# Patient Record
Sex: FEMALE | Race: WHITE | NOT HISPANIC OR LATINO | Employment: OTHER | URBAN - METROPOLITAN AREA
[De-identification: names, ages, dates, MRNs, and addresses within clinical notes are randomized per-mention and may not be internally consistent; named-entity substitution may affect disease eponyms.]

---

## 2021-03-11 ENCOUNTER — HOSPITAL ENCOUNTER (EMERGENCY)
Facility: CLINIC | Age: 71
Discharge: ANOTHER HOSPITAL | End: 2021-03-11
Attending: NURSE PRACTITIONER | Admitting: NURSE PRACTITIONER
Payer: COMMERCIAL

## 2021-03-11 ENCOUNTER — APPOINTMENT (OUTPATIENT)
Dept: GENERAL RADIOLOGY | Facility: CLINIC | Age: 71
End: 2021-03-11
Attending: NURSE PRACTITIONER
Payer: COMMERCIAL

## 2021-03-11 ENCOUNTER — TRANSFERRED RECORDS (OUTPATIENT)
Dept: HEALTH INFORMATION MANAGEMENT | Facility: CLINIC | Age: 71
End: 2021-03-11

## 2021-03-11 ENCOUNTER — APPOINTMENT (OUTPATIENT)
Dept: ULTRASOUND IMAGING | Facility: CLINIC | Age: 71
End: 2021-03-11
Attending: NURSE PRACTITIONER
Payer: COMMERCIAL

## 2021-03-11 VITALS
HEART RATE: 121 BPM | RESPIRATION RATE: 12 BRPM | DIASTOLIC BLOOD PRESSURE: 109 MMHG | OXYGEN SATURATION: 98 % | WEIGHT: 185 LBS | TEMPERATURE: 98.2 F | BODY MASS INDEX: 29.87 KG/M2 | SYSTOLIC BLOOD PRESSURE: 128 MMHG

## 2021-03-11 DIAGNOSIS — I48.91 ATRIAL FIBRILLATION WITH RVR (H): ICD-10-CM

## 2021-03-11 DIAGNOSIS — R06.09 DYSPNEA ON EXERTION: ICD-10-CM

## 2021-03-11 LAB
ALBUMIN SERPL-MCNC: 3.9 G/DL (ref 3.4–5)
ALP SERPL-CCNC: 73 U/L (ref 40–150)
ALT SERPL W P-5'-P-CCNC: 43 U/L (ref 0–50)
ANION GAP SERPL CALCULATED.3IONS-SCNC: 4 MMOL/L (ref 3–14)
APTT PPP: 26 SEC (ref 22–37)
AST SERPL W P-5'-P-CCNC: 32 U/L (ref 0–45)
BASOPHILS # BLD AUTO: 0 10E9/L (ref 0–0.2)
BASOPHILS NFR BLD AUTO: 0.4 %
BILIRUB SERPL-MCNC: 0.6 MG/DL (ref 0.2–1.3)
BUN SERPL-MCNC: 12 MG/DL (ref 7–30)
CALCIUM SERPL-MCNC: 9.4 MG/DL (ref 8.5–10.1)
CHLORIDE SERPL-SCNC: 108 MMOL/L (ref 94–109)
CO2 SERPL-SCNC: 27 MMOL/L (ref 20–32)
CREAT SERPL-MCNC: 0.73 MG/DL (ref 0.52–1.04)
DIFFERENTIAL METHOD BLD: ABNORMAL
EOSINOPHIL # BLD AUTO: 0.1 10E9/L (ref 0–0.7)
EOSINOPHIL NFR BLD AUTO: 1.6 %
ERYTHROCYTE [DISTWIDTH] IN BLOOD BY AUTOMATED COUNT: 12.8 % (ref 10–15)
FLUAV RNA RESP QL NAA+PROBE: NEGATIVE
FLUBV RNA RESP QL NAA+PROBE: NEGATIVE
GFR SERPL CREATININE-BSD FRML MDRD: 83 ML/MIN/{1.73_M2}
GLUCOSE SERPL-MCNC: 120 MG/DL (ref 70–99)
HCT VFR BLD AUTO: 38.8 % (ref 35–47)
HGB BLD-MCNC: 12.6 G/DL (ref 11.7–15.7)
IMM GRANULOCYTES # BLD: 0 10E9/L (ref 0–0.4)
IMM GRANULOCYTES NFR BLD: 0.2 %
INR PPP: 1 (ref 0.86–1.14)
LABORATORY COMMENT REPORT: NORMAL
LYMPHOCYTES # BLD AUTO: 1.3 10E9/L (ref 0.8–5.3)
LYMPHOCYTES NFR BLD AUTO: 22.6 %
MCH RBC QN AUTO: 33.3 PG (ref 26.5–33)
MCHC RBC AUTO-ENTMCNC: 32.5 G/DL (ref 31.5–36.5)
MCV RBC AUTO: 103 FL (ref 78–100)
MONOCYTES # BLD AUTO: 0.6 10E9/L (ref 0–1.3)
MONOCYTES NFR BLD AUTO: 10.3 %
NEUTROPHILS # BLD AUTO: 3.7 10E9/L (ref 1.6–8.3)
NEUTROPHILS NFR BLD AUTO: 64.9 %
NRBC # BLD AUTO: 0 10*3/UL
NRBC BLD AUTO-RTO: 0 /100
NT-PROBNP SERPL-MCNC: 2856 PG/ML (ref 0–900)
PLATELET # BLD AUTO: 190 10E9/L (ref 150–450)
POTASSIUM SERPL-SCNC: 4.8 MMOL/L (ref 3.4–5.3)
PROT SERPL-MCNC: 7.4 G/DL (ref 6.8–8.8)
RBC # BLD AUTO: 3.78 10E12/L (ref 3.8–5.2)
RSV RNA SPEC QL NAA+PROBE: NORMAL
SARS-COV-2 RNA RESP QL NAA+PROBE: NEGATIVE
SODIUM SERPL-SCNC: 139 MMOL/L (ref 133–144)
SPECIMEN SOURCE: NORMAL
TROPONIN I SERPL-MCNC: 0.02 UG/L (ref 0–0.04)
WBC # BLD AUTO: 5.6 10E9/L (ref 4–11)

## 2021-03-11 PROCEDURE — 93010 ELECTROCARDIOGRAM REPORT: CPT | Performed by: FAMILY MEDICINE

## 2021-03-11 PROCEDURE — 85610 PROTHROMBIN TIME: CPT | Performed by: NURSE PRACTITIONER

## 2021-03-11 PROCEDURE — 84484 ASSAY OF TROPONIN QUANT: CPT | Performed by: NURSE PRACTITIONER

## 2021-03-11 PROCEDURE — 71046 X-RAY EXAM CHEST 2 VIEWS: CPT

## 2021-03-11 PROCEDURE — 93005 ELECTROCARDIOGRAM TRACING: CPT | Performed by: FAMILY MEDICINE

## 2021-03-11 PROCEDURE — 99285 EMERGENCY DEPT VISIT HI MDM: CPT | Mod: 25 | Performed by: FAMILY MEDICINE

## 2021-03-11 PROCEDURE — 93970 EXTREMITY STUDY: CPT

## 2021-03-11 PROCEDURE — 96374 THER/PROPH/DIAG INJ IV PUSH: CPT | Performed by: FAMILY MEDICINE

## 2021-03-11 PROCEDURE — 87636 SARSCOV2 & INF A&B AMP PRB: CPT | Performed by: NURSE PRACTITIONER

## 2021-03-11 PROCEDURE — C9803 HOPD COVID-19 SPEC COLLECT: HCPCS | Performed by: FAMILY MEDICINE

## 2021-03-11 PROCEDURE — 250N000009 HC RX 250: Performed by: NURSE PRACTITIONER

## 2021-03-11 PROCEDURE — 80053 COMPREHEN METABOLIC PANEL: CPT | Performed by: NURSE PRACTITIONER

## 2021-03-11 PROCEDURE — 99284 EMERGENCY DEPT VISIT MOD MDM: CPT | Mod: 25 | Performed by: FAMILY MEDICINE

## 2021-03-11 PROCEDURE — 85025 COMPLETE CBC W/AUTO DIFF WBC: CPT | Performed by: NURSE PRACTITIONER

## 2021-03-11 PROCEDURE — 85730 THROMBOPLASTIN TIME PARTIAL: CPT | Performed by: NURSE PRACTITIONER

## 2021-03-11 PROCEDURE — 250N000013 HC RX MED GY IP 250 OP 250 PS 637: Performed by: NURSE PRACTITIONER

## 2021-03-11 PROCEDURE — 83880 ASSAY OF NATRIURETIC PEPTIDE: CPT | Performed by: NURSE PRACTITIONER

## 2021-03-11 RX ORDER — CICLOPIROX 80 MG/ML
SOLUTION TOPICAL EVERY EVENING
COMMUNITY
Start: 2020-11-30

## 2021-03-11 RX ORDER — ASPIRIN 81 MG/1
81 TABLET ORAL DAILY
COMMUNITY

## 2021-03-11 RX ORDER — METOPROLOL SUCCINATE 25 MG/1
25 TABLET, EXTENDED RELEASE ORAL DAILY
COMMUNITY
Start: 2021-01-12

## 2021-03-11 RX ORDER — RIBOFLAVIN (VITAMIN B2) 100 MG
2 TABLET ORAL DAILY
COMMUNITY

## 2021-03-11 RX ORDER — ZINC GLUCONATE 50 MG
50 TABLET ORAL EVERY OTHER DAY
COMMUNITY

## 2021-03-11 RX ORDER — 7-OXODEHYDROEPIANDROSTERONE,MC 100 %
2 POWDER (GRAM) MISCELLANEOUS DAILY
COMMUNITY

## 2021-03-11 RX ORDER — METOPROLOL TARTRATE 1 MG/ML
5 INJECTION, SOLUTION INTRAVENOUS EVERY 5 MIN PRN
Status: DISCONTINUED | OUTPATIENT
Start: 2021-03-11 | End: 2021-03-11 | Stop reason: HOSPADM

## 2021-03-11 RX ORDER — METOPROLOL TARTRATE 25 MG/1
25 TABLET, FILM COATED ORAL ONCE
Status: COMPLETED | OUTPATIENT
Start: 2021-03-11 | End: 2021-03-11

## 2021-03-11 RX ORDER — LANOLIN ALCOHOL/MO/W.PET/CERES
400 CREAM (GRAM) TOPICAL DAILY
COMMUNITY

## 2021-03-11 RX ORDER — CYANOCOBALAMIN (VITAMIN B-12) 500 MCG
400 LOZENGE ORAL AT BEDTIME
COMMUNITY

## 2021-03-11 RX ORDER — ROPINIROLE 0.5 MG/1
0.5 TABLET, FILM COATED ORAL AT BEDTIME
COMMUNITY
Start: 2021-03-02

## 2021-03-11 RX ORDER — AZELASTINE 1 MG/ML
1 SPRAY, METERED NASAL EVERY MORNING
COMMUNITY

## 2021-03-11 RX ORDER — ROSUVASTATIN CALCIUM 10 MG/1
5 TABLET, COATED ORAL AT BEDTIME
COMMUNITY
Start: 2021-03-01

## 2021-03-11 RX ORDER — BACILLUS COAGULANS/INULIN 1B-250 MG
1 CAPSULE ORAL EVERY MORNING
COMMUNITY

## 2021-03-11 RX ADMIN — METOPROLOL TARTRATE 25 MG: 25 TABLET ORAL at 14:06

## 2021-03-11 RX ADMIN — METOPROLOL TARTRATE 5 MG: 1 INJECTION, SOLUTION INTRAVENOUS at 14:07

## 2021-03-11 ASSESSMENT — ENCOUNTER SYMPTOMS
COUGH: 0
FATIGUE: 0
FEVER: 0
SHORTNESS OF BREATH: 1
MUSCULOSKELETAL NEGATIVE: 1
WHEEZING: 1
NEUROLOGICAL NEGATIVE: 1
GASTROINTESTINAL NEGATIVE: 1
CHILLS: 0

## 2021-03-11 NOTE — ED PROVIDER NOTES
"  History     Chief Complaint   Patient presents with     Shortness of Breath     Leg Swelling     HPI     Thu Zamora is a 70 year old female with history of paroxysmal atrial fibrillation and restless leg syndrome who presents to the emergency department for evaluation of bilateral leg swelling and intermittent feeling of slight shortness of breath that she describes as being able to \"feel her breathing\" and wheezing. Noted some increased shortness of breath yesterday when going up some stairs. Denies fever or chills. Denies dizziness or lightheadedness. Traveled by car from [place] returning here to [place] 3 days ago. Takes Metoprolol 25 mg daily for paroxysmal a-fib. She has never needed cardioversion. Has been several years since she had known a-fib. Follows with Dr. Butterfield with [facility], [place], operated by [facility]. Had an event monitor over 2 years ago without evidence of A-fib. Takes Baby ASA daily      Cardiac Event Monitor 11/2018:    Baseline tracing documented sinus rhythm at 60 beats per minute.  No symptomatic transmissions were made during monitoring.  Auto triggered events available for review showed some sinus rhythm or sinus bradycardia with premature atrial contractions.  No dysrhythmias were seen during monitoring  Echo 2015  Interpretation Summary  The left ventricle is normal in size. The visual ejection fraction is   estimated at 60-65%. Grade I left ventricular diastolic dysfunction is noted.   No regional wall motion abnormalities noted. No significant valvular heart   Disease.    Allergies:  No Known Allergies    Problem List:    Patient Active Problem List    Diagnosis Date Noted     Atrial fibrillation with rapid rate, paroxysmal 03/21/2015     Priority: Medium     Family history stroke in brother 03/21/2015     Priority: Medium     Mild alcohol use disorder 03/21/2015     Priority: Medium     Paroxysmal atrial fibrillation (H) 03/21/2015     Priority: Medium        Past Medical History:  "   Past Medical History:   Diagnosis Date     Hypertension        Past Surgical History:    Past Surgical History:   Procedure Laterality Date     HC KNEE SCOPE,MED/LAT MENISCUS REPAIR Right        Family History:    Family History   Problem Relation Age of Onset     Heart Disease Mother         leaky valve, atrial fibrillation     Heart Disease Brother         leaky valve, atrial fibrillation     Cerebrovascular Disease Brother         2 strokes       Social History:  Marital Status:   [2]  Social History     Tobacco Use     Smoking status: Former Smoker     Quit date: 1995     Years since quittin.2     Smokeless tobacco: Never Used   Substance Use Topics     Alcohol use: Yes     Alcohol/week: 11.7 standard drinks     Types: 14 Cans of beer per week     Comment: 3 drinks/day     Drug use: No        Medications:    7-Keto DHEA POWD  aspirin 81 MG EC tablet  azelastine (ASTELIN) 0.1 % nasal spray  Bacillus Coagulans-Inulin (PROBIOTIC) 1-250 BILLION-MG CAPS  calcium carbonate (OS-SUMMER 500 MG [place]. [place]) 500 MG tablet  cetirizine (ZYRTEC) 10 MG tablet  ciclopirox (PENLAC) 8 % external solution  glucosamine-chondroitinoitin 500-400 MG tablet  magnesium oxide (MAG-OX) 400 (240 Mg) MG tablet  metoprolol succinate ER (TOPROL-XL) 25 MG 24 hr tablet  polyethylene glycol-propylene glycol (SYSTANE ULTRA) 0.4-0.3 % SOLN ophthalmic solution  rOPINIRole (REQUIP) 0.5 MG tablet  rosuvastatin (CRESTOR) 10 MG tablet  vitamin E 400 units TABS  White Petrolatum-Mineral Oil (TEARS AGAIN) OINT  zinc gluconate 50 MG tablet  fluticasone (FLONASE) 50 MCG/ACT nasal spray  predniSONE (DELTASONE) 20 MG tablet          Review of Systems   Constitutional: Negative for chills, fatigue and fever.   HENT: Negative.    Respiratory: Positive for shortness of breath and wheezing. Negative for cough.    Cardiovascular: Positive for leg swelling. Negative for chest pain.   Gastrointestinal: Negative.    Genitourinary: Negative.     Musculoskeletal: Negative.    Skin: Negative.    Neurological: Negative.    All other systems reviewed and are negative.      Physical Exam   BP: (!) 145/99  Pulse: 143  Temp: 98.2  F (36.8  C)  Resp: 16  Weight: 83.9 kg (185 lb)  SpO2: 99 %      Physical Exam  Vitals signs reviewed.   Constitutional:       General: She is not in acute distress.     Appearance: She is well-developed. She is not ill-appearing.   HENT:      Head: Normocephalic and atraumatic.      Nose: Nose normal.      Mouth/Throat:      Mouth: Mucous membranes are moist.   Eyes:      Conjunctiva/sclera: Conjunctivae normal.   Cardiovascular:      Rate and Rhythm: Tachycardia present. Rhythm irregular.      Heart sounds: No murmur.   Pulmonary:      Effort: Pulmonary effort is normal. No respiratory distress.      Breath sounds: Normal breath sounds. No wheezing or rhonchi.   Abdominal:      General: Bowel sounds are normal. There is no distension.      Palpations: Abdomen is soft.      Tenderness: There is no abdominal tenderness.   Musculoskeletal: Normal range of motion.      Right lower leg: Edema present.      Left lower leg: Edema present.   Skin:     General: Skin is warm and dry.   Neurological:      General: No focal deficit present.      Mental Status: She is alert and oriented to person, place, and time.         ED Course        Procedures               EKG Interpretation:      Interpreted by SUNDAY Nicholson CNP  Time reviewed: 1320  Symptoms at time of EKG: none, dyspnea with exertion   Rhythm: atrial fibrillation  Rate: tachycardia (120-140s)  Axis: normal  Ectopy: none  Conduction: normal  ST Segments/ T Waves: No ST-T wave changes, decreasing R-wave progression  Q Waves: none  Comparison to prior: changed compared to 07/08/2015    Clinical Impression: Atrial fibrillation with RVR, no evidence of acute ischemia.        Results for orders placed or performed during the hospital encounter of 03/11/21 (from the past 24  hour(s))   CBC with platelets differential   Result Value Ref Range    WBC 5.6 4.0 - 11.0 10e9/L    RBC Count 3.78 (L) 3.8 - 5.2 10e12/L    Hemoglobin 12.6 11.7 - 15.7 g/dL    Hematocrit 38.8 35.0 - 47.0 %     (H) 78 - 100 fl    MCH 33.3 (H) 26.5 - 33.0 pg    MCHC 32.5 31.5 - 36.5 g/dL    RDW 12.8 10.0 - 15.0 %    Platelet Count 190 150 - 450 10e9/L    Diff Method Automated Method     % Neutrophils 64.9 %    % Lymphocytes 22.6 %    % Monocytes 10.3 %    % Eosinophils 1.6 %    % Basophils 0.4 %    % Immature Granulocytes 0.2 %    Nucleated RBCs 0 0 /100    Absolute Neutrophil 3.7 1.6 - 8.3 10e9/L    Absolute Lymphocytes 1.3 0.8 - 5.3 10e9/L    Absolute Monocytes 0.6 0.0 - 1.3 10e9/L    Absolute Eosinophils 0.1 0.0 - 0.7 10e9/L    Absolute Basophils 0.0 0.0 - 0.2 10e9/L    Abs Immature Granulocytes 0.0 0 - 0.4 10e9/L    Absolute Nucleated RBC 0.0    Comprehensive metabolic panel   Result Value Ref Range    Sodium 139 133 - 144 mmol/L    Potassium 4.8 3.4 - 5.3 mmol/L    Chloride 108 94 - 109 mmol/L    Carbon Dioxide 27 20 - 32 mmol/L    Anion Gap 4 3 - 14 mmol/L    Glucose 120 (H) 70 - 99 mg/dL    Urea Nitrogen 12 7 - 30 mg/dL    Creatinine 0.73 0.52 - 1.04 mg/dL    GFR Estimate 83 >60 mL/min/[1.73_m2]    GFR Estimate If Black >90 >60 mL/min/[1.73_m2]    Calcium 9.4 8.5 - 10.1 mg/dL    Bilirubin Total 0.6 0.2 - 1.3 mg/dL    Albumin 3.9 3.4 - 5.0 g/dL    Protein Total 7.4 6.8 - 8.8 g/dL    Alkaline Phosphatase 73 40 - 150 U/L    ALT 43 0 - 50 U/L    AST 32 0 - 45 U/L   Troponin I   Result Value Ref Range    Troponin I ES 0.022 0.000 - 0.045 ug/L   INR   Result Value Ref Range    INR 1.00 0.86 - 1.14   Partial thromboplastin time   Result Value Ref Range    PTT 26 22 - 37 sec   NT pro BNP   Result Value Ref Range    N-Terminal Pro BNP Inpatient 2,856 (H) 0 - 900 pg/mL   Symptomatic Influenza A/B & SARS-CoV2 (COVID-19) Virus PCR Multiplex    Specimen: Nasopharyngeal   Result Value Ref Range    Flu A/B & SARS-COV-2  PCR Source Nasopharyngeal     SARS-CoV-2 PCR Result NEGATIVE     Influenza A PCR Negative NEG^Negative    Influenza B PCR Negative NEG^Negative    Respiratory Syncytial Virus PCR (Note)     Flu A/B & SARS-CoV-2 PCR Comment (Note)    US Lower Extremity Venous Duplex Bilateral    Narrative    VENOUS ULTRASOUND BILATERALLY LEG(S)  3/11/2021 3:23 PM     HISTORY: bilateral LE swelling, left > right.    COMPARISON: None.    FINDINGS: Examination of the deep veins with graded compression and  color flow Doppler with spectral wave form analysis was performed.  Images show no evidence of thrombus in the bilateral common femoral  vein, femoral vein, popliteal vein or calf veins.      Impression    IMPRESSION: No deep vein thrombosis in either lower extremity.      CRISTOPHER DIAZ, DO   XR Chest 2 Views    Narrative    XR CHEST TWO VIEWS   3/11/2021 4:50 PM     HISTORY: Short of breath    COMPARISON: Chest x-rays dated 7/5/2015.    FINDINGS:  The lungs are clear. No pleural effusions or pneumothorax.  Heart size and pulmonary vascularity are within normal limits. No  acute fracture.      Impression    IMPRESSION: No evidence of acute cardiopulmonary disease is seen.    TIERA KIMBLE MD       Medications   metoprolol (LOPRESSOR) injection 5 mg (5 mg Intravenous Given 3/11/21 1407)   metoprolol tartrate (LOPRESSOR) tablet 25 mg (25 mg Oral Given 3/11/21 1406)     4:08 PM: spoke with Dr. Maier, on-call with [facility], recommends admission, anticoagulation, and likely a ELOISE with cardioversion   4:35 PM: spoke with Dr. Lawson (sp?) at [facility]. Will assume care on transfer.  4:43 PM: bed control ([facility]oey) at [facility] states no tele beds, patient refuses [facility]. They will continue to work on the bed.  5:00 PM: [facility] notified me that [facility] has a bed and patient will transfer there with same pro    Assessments & Plan (with Medical Decision Making)   70-year-old female with history of paroxysmal  atrial fibrillation who presents to the emergency department for evaluation of dyspnea on exertion that started approximately 1-2 weeks ago, patient is vague about when this started.  Patient has EKG confirming atrial fibrillation with RVR (a rate of 140s.)  Patient otherwise has no symptoms of chest pain, headache, dizziness or lightheadedness.  Patient recently drove back from a vacation in [place] and returned to [place] 3 days ago by car.  She reports increased bilateral lower extremity edema after the care ride.  Ultrasound was obtained to rule out DVT and is negative for DVT.  Her lung sounds are CTA.  No hypoxia. BP mildly elevated here. Patient takes metoprolol 25 mg a day for paroxysmal A. fib, she has no history of hypertension.  CBC is unremarkable.  Glucose is 120, patient has no history of diabetes.  Electrolytes are normal.  Kidney function labs are normal.  LFTs are normal.  BNP is elevated at 2856.  Patient has no history for heart failure, but has not had any recent echocardiogram.  Last echo was in 2015 with an EF of 60 to 65%. Chest xray is normal without evidence of failure. Troponin is 0.022.  Covid test was obtained and is negative.  ChadVasc score is 1. Patient is currently on baby ASA daily. No prior history of cardioversion. Patient was given IV Metoprolol 5 mg and PO metoprolol 25 mg with minimal improvement in her rate (down to 100-120b/min)    Patient currently follows with [facility] (cardiology), Dr. Butterfield.  I spoke with the on-call cardiologist for this group, Dr. Maier, who recommends admission to the hospital, anticoagulation, and likely ELOISE with cardioversion. I spoke with hospitalist at [facility], Dr. Lawson (sp?) who agrees to assume care of patient on transfer. hospitalist expressed concern of starting patient on anticoagulation without knowing if she is having heart failure, and anticipates getting an echocardiogram there. I will defer the  anticoagulation medication to the hospital care team. The HUC here was notified that there was not a tele bed at [facility], but could accept the patient at [facility]. Patient agrees with going to [facility], but states she does not want to go to [facility]. Patient transferred via EMS, stable.     I have reviewed the nursing notes.    I have reviewed the findings, diagnosis, plan and need for follow up with the patient.       IXVBO-2-Pflg Score  (calculator)  Background  Calculates overall risk of atrial fibrillation related CVA based on 7 factors: Age>=65-75, CHF, Htn, CVA/TIA, DM, vascular disease, female  Data  70 year old year old female  has Atrial fibrillation with rapid rate, paroxysmal; Family history stroke in brother; Mild alcohol use disorder; and Paroxysmal atrial fibrillation (H) on their problem list.  Criteria   Of possible 6 points for 5 possible items  1 point: Female    Interpretation  AOOSM-3-Dlac Score: 1  CHADS Score 1 (CVA risk >4% per year):  Warfarin or Aspirin      HAS-BLED Score for Major Bleeding on anticoagulants (calculator)  Background  Calculates probability of major bleeding on anticoagulants based on 9 criteria including hypertension, creatinine,  LFTs, stroke history, major bleeding history, labile INRs, age over 65, other anticoagulant use and excessive alcohol.  Data  70 year old  has Atrial fibrillation with rapid rate, paroxysmal; Family history stroke in brother; Mild alcohol use disorder; and Paroxysmal atrial fibrillation (H) on their problem list.  @cmedp@   reports current alcohol use of about 11.7 standard drinks of alcohol per week.  BP: 120/88  Lab Results   Component Value Date    CR 0.69 07/08/2015     Lab Results   Component Value Date    BILITOTAL 0.4 07/08/2015    ALKPHOS 68 07/08/2015    AST 16 07/08/2015    ALT 22 07/08/2015      Lab Results   Component Value Date    INR 1.01 03/22/2015    INR 0.95 03/21/2015     Criteria   Score one point for each present (up  to 9 points):  Other anticoagulants, antiplatelet agents or NSAIDs  Interpretation  HAS-BLED Score: 1  Score 1 points:  Major bleeding risk 3.4% per year        Discharge Medication List as of 3/11/2021  6:31 PM          Final diagnoses:   Atrial fibrillation with RVR (H)   Dyspnea on exertion       3/11/2021   [facility] EMERGENCY DEPT     Chinyere Dean APRN CNP  03/11/21 1941

## 2021-03-11 NOTE — ED TRIAGE NOTES
Pt presents with shortness of breath at the beginning of the month when they were still in [place]. Pt states she could hear her breathing. Pt states they drove back from [place], driving 6 hours a day in the car and drove Friday and got home Monday. Pt states she noticed swelling in her left leg and now right leg starting to swell too.

## 2021-03-19 ENCOUNTER — IMMUNIZATION (OUTPATIENT)
Dept: FAMILY MEDICINE | Facility: CLINIC | Age: 71
End: 2021-03-19
Payer: COMMERCIAL

## 2021-03-19 PROCEDURE — 91301 PR COVID VAC MODERNA 100 MCG/0.5 ML IM: CPT

## 2021-03-19 PROCEDURE — 0011A PR COVID VAC MODERNA 100 MCG/0.5 ML IM: CPT

## 2021-04-12 ENCOUNTER — TELEPHONE (OUTPATIENT)
Dept: FAMILY MEDICINE | Facility: CLINIC | Age: 71
End: 2021-04-12

## 2021-04-12 NOTE — TELEPHONE ENCOUNTER
Patient called and states she has had her 1st COVID 19 vaccination last week, she was exposed to someone that was positive for COVID and concerned if she can get her 2nd vaccination.    This RN advised to complete the vaccination process and keep her upcoming appointment for the 2nd vaccination.    Patient stated understanding.    Cassie Diamond RN

## 2021-04-16 ENCOUNTER — IMMUNIZATION (OUTPATIENT)
Dept: FAMILY MEDICINE | Facility: CLINIC | Age: 71
End: 2021-04-16
Attending: FAMILY MEDICINE
Payer: COMMERCIAL

## 2021-04-16 PROCEDURE — 91301 PR COVID VAC MODERNA 100 MCG/0.5 ML IM: CPT

## 2021-04-16 PROCEDURE — 0012A PR COVID VAC MODERNA 100 MCG/0.5 ML IM: CPT

## 2021-07-29 ENCOUNTER — OFFICE VISIT (OUTPATIENT)
Dept: PODIATRY | Facility: CLINIC | Age: 71
End: 2021-07-29
Payer: COMMERCIAL

## 2021-07-29 VITALS
WEIGHT: 186.5 LBS | HEIGHT: 66 IN | DIASTOLIC BLOOD PRESSURE: 68 MMHG | BODY MASS INDEX: 29.97 KG/M2 | TEMPERATURE: 96.3 F | SYSTOLIC BLOOD PRESSURE: 124 MMHG

## 2021-07-29 DIAGNOSIS — L60.8 PINCER NAIL DEFORMITY: Primary | ICD-10-CM

## 2021-07-29 DIAGNOSIS — L60.3 ONYCHODYSTROPHY: ICD-10-CM

## 2021-07-29 PROCEDURE — 99203 OFFICE O/P NEW LOW 30 MIN: CPT | Performed by: PODIATRIST

## 2021-07-29 RX ORDER — LATANOPROST 50 UG/ML
SOLUTION/ DROPS OPHTHALMIC
COMMUNITY
Start: 2021-07-12

## 2021-07-29 RX ORDER — LOSARTAN POTASSIUM 25 MG/1
25 TABLET ORAL
COMMUNITY
Start: 2021-05-12

## 2021-07-29 RX ORDER — SOTALOL HYDROCHLORIDE 80 MG/1
TABLET ORAL
COMMUNITY
Start: 2021-07-02

## 2021-07-29 ASSESSMENT — PAIN SCALES - GENERAL: PAINLEVEL: MODERATE PAIN (5)

## 2021-07-29 ASSESSMENT — MIFFLIN-ST. JEOR: SCORE: 1377.71

## 2021-07-29 NOTE — PATIENT INSTRUCTIONS
"Nail Debridement    A high quality instrument makes trimming toenails MUCH easier.  Search ebay for any 5\" nail nipper manufactured by reliable brands such as Miltex, Integra or Jarit as these quality instruments will help manage difficult nails more effectively and comfortably. We use Miltex -SS.  A physician is not necessary to trim nails even if you are taking blood thinners or are diabetic.  Your family or care givers may help manage your toenails.      Trim or sand the nails once weekly.  Do not wait until they are long and painful or trimming will become too difficult and painful and will increase your risk of complications or infection.  A course file or 120 grit sandpaper on a sanding block can be helpful.  For very thick nails many people prefer battery operated zhu such as an Amope', Personal Pedi and Emjoi for regular use or heavy painful callouses or thick toenails.    Trim or skive any portion of nail that is thick, loose, crumbling, or not well attached. Do not tear the nail away, but rather cut them with a nail nipperor sand or sand them down.  You may follow up with your Podiatric Physician if you have pain, bleeding, infection, questions or other concerns.      You may also contact the following Registered Nurses for further help with nail debridement and minor hygiene concnerns.  They may come to your home or meet them at their clinic to trim your toenails and soak your feet, as well as monitor for any complications that would require evaluation by a Physician.      [facility]  Mariama Sandhu RN  Phone & text [phone]    [facility]  Maddison Ruggiero RN  Office [phone]    [facility]  Marianne Mora RN  [phone]   Call or text for appointment  Some home visits and has a clinic at:  [address] [place] [id number]    [facility]  Guerrero Viveros RN  [phone]    [facility]  [phone]  [place], [place], [place]    [facility]  [phone]  [email or web address]    For up to date list and to find foot care nurses in other communities visit American [employer] Nurses Association website:  afcna.org.     Calluses, Corns, IPKs, Porokeratosis    When there is excessive friction or pressure on the skin, the body responds by making the skin thicker.  While this may protect the deeper structures, the thickened skin can take up more space and thus increase pressure over a bony prominence or become an open sore or skin ulcer as this skin becomes less flexible.    Flat, diffuse thickening are simple calluses and they are usually caused by friction.  Often these are the result of rubbing on a shoe or going barefoot.    Calluses with a central core between the toes are called corns.  These often result from prominent joints on adjacent toes rubbing together.  Theses are often a symptom of bone malalignment and will usually recur unless the underlying bones are addressed.    Many of these lesions can be kept comfortable with routine maintenance. This consists of filing them with a Ped Egg, callus file, or 120 grit sandpaper on a block, every day during your bath or shower.  Most people prefer battery operated zhu such as an Amope', Personal Pedi and Emjoi for regular use or heavy painful callouses.  Heavy creams or ointments can be applied 1-2 times every day to keep them soft. Toe spacers can be used for corns, gel pads can be used for other lesions on the bottom of the foot. If there is a deformity noted, such as a prominent bone, often this can be addressed to minimize recurrence. However, sometimes the pressure and lesion simply migrates to another spot after surgery, so it is not a guaranteed cure.     If you have severe callouses and cracking, you may apply heavy ointments that you scoop up such as Cetaphil cream, Eucerin, Aquaphor or  Vaseline.  Be sure to obtain cream or ointment in these brands and not lotion (lotion is water based and not durable enough for feet). For more aggressive help apply heavy creams or ointment under occlusive dressings such as Saran Wrap or Jelly Feet while sleeping.   Jelly Feet can be obtained at www.jellyfeet.com.     To be successful with managing hyperkeratotic skin, you must manage hygiene daily.  Apply the cream once or twice EVERY day.  At your bath or shower time is the easiest time to work on this when skin is most soft.  There is no medical or surgical treatment that will absolutely eliminate many of these symptoms.      Pedifix is a reliable source for all sorts of foot pads, cushions, or interdigital spacers and foot appliances. Go to www.Povio.Violet Grey or request a catalog at 5-962-[employer].        Please call with any additional questions.

## 2021-07-29 NOTE — PROGRESS NOTES
"HPI:  Thu Zamora is a 70 year old female who is seen in consultation at the request of self.      Pt presents for eval of:      Bilateral great toenail fungus, Right toenail has some puss coming out of the left corner.     Works as a volunteer work .    Weight management plan: Patient was referred to their PCP to discuss a diet and exercise plan.       Review of Systems:  Patient denies fever, chills, rash, wound, stiffness, limping, numbness, weakness, heart burn, blood in stool, chest pain with activity, calf pain when walking, shortness of breath with activity, chronic cough, easy bleeding/bruising, swelling of ankles, excessive thirst, fatigue, depression, anxiety.  Patient admits only to symptoms noted in history.     Patient Active Problem List   Diagnosis     Atrial fibrillation with rapid rate, paroxysmal     Family history stroke in brother     Mild alcohol use disorder     Paroxysmal atrial fibrillation (H)     PAST MEDICAL HISTORY:   Past Medical History:   Diagnosis Date     Hypertension      PAST SURGICAL HISTORY:   Past Surgical History:   Procedure Laterality Date     HC KNEE SCOPE,MED/LAT MENISCUS REPAIR Right      MEDICATIONS:   Current Outpatient Medications:      7-Keto DHEA POWD, Take 2 capsules by mouth daily Diet pill \"KETO\", Disp: , Rfl:      azelastine (ASTELIN) 0.1 % nasal spray, Spray 1 spray into both nostrils every morning, Disp: , Rfl:      calcium carbonate (OS-SUMMER 500 MG [place]. [place]) 500 MG tablet, Take 500 mg by mouth daily, Disp: , Rfl:      cetirizine (ZYRTEC) 10 MG tablet, 1 tab up to twice a day for itch/rash, Disp: 20 tablet, Rfl: 1     ciclopirox (PENLAC) 8 % external solution, Apply topically every evening, Disp: , Rfl:      fluticasone (FLONASE) 50 MCG/ACT nasal spray, Spray 2 sprays into both nostrils daily, Disp: , Rfl:      glucosamine-chondroitinoitin 500-400 MG tablet, Take 2 tablets by mouth daily, Disp: , Rfl:      latanoprost (XALATAN) 0.005 % ophthalmic solution, , " Disp: , Rfl:      losartan (COZAAR) 25 MG tablet, Take 25 mg by mouth, Disp: , Rfl:      magnesium oxide (MAG-OX) 400 (240 Mg) MG tablet, Take 400 mg by mouth daily, Disp: , Rfl:      metoprolol succinate ER (TOPROL-XL) 25 MG 24 hr tablet, Take 25 mg by mouth daily, Disp: , Rfl:      polyethylene glycol-propylene glycol (SYSTANE ULTRA) 0.4-0.3 % SOLN ophthalmic solution, Place 1 drop into both eyes every morning, Disp: , Rfl:      rOPINIRole (REQUIP) 0.5 MG tablet, Take 0.5 mg by mouth At Bedtime, Disp: , Rfl:      rosuvastatin (CRESTOR) 10 MG tablet, Take 5 mg by mouth At Bedtime, Disp: , Rfl:      vitamin E 400 units TABS, Take 400 Units by mouth At Bedtime, Disp: , Rfl:      White Petrolatum-Mineral Oil (TEARS AGAIN) OINT, Place 1 strip into both ears At Bedtime, Disp: , Rfl:      zinc gluconate 50 MG tablet, Take 50 mg by mouth every other day, Disp: , Rfl:      aspirin 81 MG EC tablet, Take 81 mg by mouth daily (Patient not taking: Reported on 2021), Disp: , Rfl:      Bacillus Coagulans-Inulin (PROBIOTIC) 1-250 BILLION-MG CAPS, Take 1 capsule by mouth every morning (Patient not taking: Reported on 2021), Disp: , Rfl:      predniSONE (DELTASONE) 20 MG tablet, 1 tab daily for 3 days then 1/2 tab daily for 2 days (Patient not taking: Reported on 2021), Disp: 4 tablet, Rfl: 0     sotalol (BETAPACE) 80 MG tablet, , Disp: , Rfl:   ALLERGIES:  No Known Allergies  SOCIAL HISTORY:   Social History     Socioeconomic History     Marital status:      Spouse name: Not on file     Number of children: Not on file     Years of education: Not on file     Highest education level: Not on file   Occupational History     Not on file   Tobacco Use     Smoking status: Former Smoker     Quit date: 1995     Years since quittin.5     Smokeless tobacco: Never Used   Substance and Sexual Activity     Alcohol use: Yes     Alcohol/week: 11.7 standard drinks     Types: 14 Cans of beer per week     Comment: 3  "drinks/day     Drug use: No     Sexual activity: Never   Other Topics Concern     Parent/sibling w/ CABG, MI or angioplasty before 65F 55M? Not Asked   Social History Narrative     Not on file     Social Determinants of Health     Financial Resource Strain:      Difficulty of Paying Living Expenses:    Food Insecurity:      Worried About Running Out of Food in the Last Year:      Ran Out of Food in the Last Year:    Transportation Needs:      Lack of Transportation (Medical):      Lack of Transportation (Non-Medical):    Physical Activity:      Days of Exercise per Week:      Minutes of Exercise per Session:    Stress:      Feeling of Stress :    Social Connections:      Frequency of Communication with Friends and Family:      Frequency of Social Gatherings with Friends and Family:      Attends Jew Services:      Active Member of Clubs or Organizations:      Attends Club or Organization Meetings:      Marital Status:    Intimate Partner Violence:      Fear of Current or Ex-Partner:      Emotionally Abused:      Physically Abused:      Sexually Abused:      FAMILY HISTORY:   Family History   Problem Relation Age of Onset     Heart Disease Mother         leaky valve, atrial fibrillation     Heart Disease Brother         leaky valve, atrial fibrillation     Cerebrovascular Disease Brother         2 strokes     EXAM:Vitals: /68 (BP Location: Left arm, Patient Position: Sitting, Cuff Size: Adult Regular)   Temp (!) 96.3  F (35.7  C) (Temporal)   Ht 1.676 m (5' 6\")   Wt 84.6 kg (186 lb 8 oz)   BMI 30.10 kg/m    BMI= Body mass index is 30.1 kg/m .    General appearance: Patient is alert and fully cooperative with history & exam.  No sign of distress is noted during the visit.     Psychiatric: Affect is pleasant & appropriate.  Patient appears motivated to improve health.     Respiratory: Breathing is regular & unlabored while sitting.     HEENT: Hearing is intact to spoken word.  Speech is clear.  No gross " evidence of visual impairment that would impact ambulation.     Vascular: DP 2/4 & PT 2/4 left & right.  CFT delayed with dependent rubor noted about the digits.  Diminished hair growth distal to mid tibia and no hair about the foot and toes.  Temperature changes noted, warm to cool proximal to distal.  Hemosiderin pigmentation noted with multiple varicosities legs and feet bilateral. Generalized edema bilateral legs and feet.  Pt denies claudication history.     Neurologic: Normal plantar response bilateral.  Intact protective threshold plus 10/10 applications of a 5.07 monofilament.  Pt admits no burning and paraesthesias about the feet and toes with palpation.     Dermatologic: Reasonable texture turgor tone about the integument.  Both hallux nails are forming pincer nails.  They are painful along the borders without drainage.     Musculoskeletal: Patient is ambulatory without assistive device or brace.  There is semi reducible contracture of the lesser digits.    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 Onychodystrophy  L60.3         PLAN:    7/29/2021  Discussed etiology and treatment options.  Primary pathology here is not associated with onychomycosis.  Primary pathology is dystrophic nail and pincer ingrown pincer nails.  Topical and/or oral antifungals may reduce any fungal component but will not change the shape or thickness or adhesion of her toenail.  Simple moisturizers skin moisturizer such as Cetaphil cream may soften her nail.  I would not expect any antifungal medication to have much of an effect on her toenails.  We discussed appropriate debridement techniques and written instructions were dispensed.  We discussed personal Armstrong as well as foot care certified nurses if necessary.    We also discussed surgical removal of the affected toenails.  We discussed the postoperative  course potential risks and complications.  At this time she would like to consider this and will follow-up for matrixectomy if her symptoms warrant more advanced treatment  All questions were answered      Kris Jean DPM

## 2021-07-29 NOTE — LETTER
"    7/29/2021         RE: Thu Zamora  [address] [facility] [id number]        Dear Colleague,    Thank you for referring your patient, Thu Zamora, to the [facility]. Please see a copy of my visit note below.    HPI:  Thu Zamora is a 70 year old female who is seen in consultation at the request of self.      Pt presents for eval of:      Bilateral great toenail fungus, Right toenail has some puss coming out of the left corner.     Works as a volunteer work .    Weight management plan: Patient was referred to their PCP to discuss a diet and exercise plan.       Review of Systems:  Patient denies fever, chills, rash, wound, stiffness, limping, numbness, weakness, heart burn, blood in stool, chest pain with activity, calf pain when walking, shortness of breath with activity, chronic cough, easy bleeding/bruising, swelling of ankles, excessive thirst, fatigue, depression, anxiety.  Patient admits only to symptoms noted in history.     Patient Active Problem List   Diagnosis     Atrial fibrillation with rapid rate, paroxysmal     Family history stroke in brother     Mild alcohol use disorder     Paroxysmal atrial fibrillation (H)     PAST MEDICAL HISTORY:   Past Medical History:   Diagnosis Date     Hypertension      PAST SURGICAL HISTORY:   Past Surgical History:   Procedure Laterality Date     HC KNEE SCOPE,MED/LAT MENISCUS REPAIR Right      MEDICATIONS:   Current Outpatient Medications:      7-Keto DHEA POWD, Take 2 capsules by mouth daily Diet pill \"KETO\", Disp: , Rfl:      azelastine (ASTELIN) 0.1 % nasal spray, Spray 1 spray into both nostrils every morning, Disp: , Rfl:      calcium carbonate (OS-SUMMER 500 MG [place]. [place]) 500 MG tablet, Take 500 mg by mouth daily, Disp: , Rfl:      cetirizine (ZYRTEC) 10 MG tablet, 1 tab up to twice a day for itch/rash, Disp: 20 tablet, Rfl: 1     ciclopirox (PENLAC) 8 % external solution, Apply topically every evening, Disp: , " Rfl:      fluticasone (FLONASE) 50 MCG/ACT nasal spray, Spray 2 sprays into both nostrils daily, Disp: , Rfl:      glucosamine-chondroitinoitin 500-400 MG tablet, Take 2 tablets by mouth daily, Disp: , Rfl:      latanoprost (XALATAN) 0.005 % ophthalmic solution, , Disp: , Rfl:      losartan (COZAAR) 25 MG tablet, Take 25 mg by mouth, Disp: , Rfl:      magnesium oxide (MAG-OX) 400 (240 Mg) MG tablet, Take 400 mg by mouth daily, Disp: , Rfl:      metoprolol succinate ER (TOPROL-XL) 25 MG 24 hr tablet, Take 25 mg by mouth daily, Disp: , Rfl:      polyethylene glycol-propylene glycol (SYSTANE ULTRA) 0.4-0.3 % SOLN ophthalmic solution, Place 1 drop into both eyes every morning, Disp: , Rfl:      rOPINIRole (REQUIP) 0.5 MG tablet, Take 0.5 mg by mouth At Bedtime, Disp: , Rfl:      rosuvastatin (CRESTOR) 10 MG tablet, Take 5 mg by mouth At Bedtime, Disp: , Rfl:      vitamin E 400 units TABS, Take 400 Units by mouth At Bedtime, Disp: , Rfl:      White Petrolatum-Mineral Oil (TEARS AGAIN) OINT, Place 1 strip into both ears At Bedtime, Disp: , Rfl:      zinc gluconate 50 MG tablet, Take 50 mg by mouth every other day, Disp: , Rfl:      aspirin 81 MG EC tablet, Take 81 mg by mouth daily (Patient not taking: Reported on 7/29/2021), Disp: , Rfl:      Bacillus Coagulans-Inulin (PROBIOTIC) 1-250 BILLION-MG CAPS, Take 1 capsule by mouth every morning (Patient not taking: Reported on 7/29/2021), Disp: , Rfl:      predniSONE (DELTASONE) 20 MG tablet, 1 tab daily for 3 days then 1/2 tab daily for 2 days (Patient not taking: Reported on 7/29/2021), Disp: 4 tablet, Rfl: 0     sotalol (BETAPACE) 80 MG tablet, , Disp: , Rfl:   ALLERGIES:  No Known Allergies  SOCIAL HISTORY:   Social History     Socioeconomic History     Marital status:      Spouse name: Not on file     Number of children: Not on file     Years of education: Not on file     Highest education level: Not on file   Occupational History     Not on file   Tobacco Use      "Smoking status: Former Smoker     Quit date: 1995     Years since quittin.5     Smokeless tobacco: Never Used   Substance and Sexual Activity     Alcohol use: Yes     Alcohol/week: 11.7 standard drinks     Types: 14 Cans of beer per week     Comment: 3 drinks/day     Drug use: No     Sexual activity: Never   Other Topics Concern     Parent/sibling w/ CABG, MI or angioplasty before 65F 55M? Not Asked   Social History Narrative     Not on file     Social Determinants of Health     Financial Resource Strain:      Difficulty of Paying Living Expenses:    Food Insecurity:      Worried About Running Out of Food in the Last Year:      Ran Out of Food in the Last Year:    Transportation Needs:      Lack of Transportation (Medical):      Lack of Transportation (Non-Medical):    Physical Activity:      Days of Exercise per Week:      Minutes of Exercise per Session:    Stress:      Feeling of Stress :    Social Connections:      Frequency of Communication with Friends and Family:      Frequency of Social Gatherings with Friends and Family:      Attends Adventism Services:      Active Member of Clubs or Organizations:      Attends Club or Organization Meetings:      Marital Status:    Intimate Partner Violence:      Fear of Current or Ex-Partner:      Emotionally Abused:      Physically Abused:      Sexually Abused:      FAMILY HISTORY:   Family History   Problem Relation Age of Onset     Heart Disease Mother         leaky valve, atrial fibrillation     Heart Disease Brother         leaky valve, atrial fibrillation     Cerebrovascular Disease Brother         2 strokes     EXAM:Vitals: /68 (BP Location: Left arm, Patient Position: Sitting, Cuff Size: Adult Regular)   Temp (!) 96.3  F (35.7  C) (Temporal)   Ht 1.676 m (5' 6\")   Wt 84.6 kg (186 lb 8 oz)   BMI 30.10 kg/m    BMI= Body mass index is 30.1 kg/m .    General appearance: Patient is alert and fully cooperative with history & exam.  No sign of distress " is noted during the visit.     Psychiatric: Affect is pleasant & appropriate.  Patient appears motivated to improve health.     Respiratory: Breathing is regular & unlabored while sitting.     HEENT: Hearing is intact to spoken word.  Speech is clear.  No gross evidence of visual impairment that would impact ambulation.     Vascular: DP 2/4 & PT 2/4 left & right.  CFT delayed with dependent rubor noted about the digits.  Diminished hair growth distal to mid tibia and no hair about the foot and toes.  Temperature changes noted, warm to cool proximal to distal.  Hemosiderin pigmentation noted with multiple varicosities legs and feet bilateral. Generalized edema bilateral legs and feet.  Pt denies claudication history.     Neurologic: Normal plantar response bilateral.  Intact protective threshold plus 10/10 applications of a 5.07 monofilament.  Pt admits no burning and paraesthesias about the feet and toes with palpation.     Dermatologic: Reasonable texture turgor tone about the integument.  Both hallux nails are forming pincer nails.  They are painful along the borders without drainage.     Musculoskeletal: Patient is ambulatory without assistive device or brace.  There is semi reducible contracture of the lesser digits.    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 Onychodystrophy  L60.3         PLAN:    7/29/2021  Discussed etiology and treatment options.  Primary pathology here is not associated with onychomycosis.  Primary pathology is dystrophic nail and pincer ingrown pincer nails.  Topical and/or oral antifungals may reduce any fungal component but will not change the shape or thickness or adhesion of her toenail.  Simple moisturizers skin moisturizer such as Cetaphil cream may soften her nail.  I would not expect any antifungal medication to have much of an effect on her toenails.  We  discussed appropriate debridement techniques and written instructions were dispensed.  We discussed personal Armstrong as well as foot care certified nurses if necessary.    We also discussed surgical removal of the affected toenails.  We discussed the postoperative course potential risks and complications.  At this time she would like to consider this and will follow-up for matrixectomy if her symptoms warrant more advanced treatment  All questions were answered      Kris Jean DPM        Again, thank you for allowing me to participate in the care of your patient.        Sincerely,        Kris Jean DPM

## 2025-03-11 ENCOUNTER — OFFICE VISIT (OUTPATIENT)
Dept: SLEEP MEDICINE | Facility: CLINIC | Age: 75
End: 2025-03-11
Payer: COMMERCIAL

## 2025-03-11 VITALS
OXYGEN SATURATION: 97 % | HEIGHT: 66 IN | WEIGHT: 190 LBS | HEART RATE: 64 BPM | BODY MASS INDEX: 30.53 KG/M2 | RESPIRATION RATE: 12 BRPM | SYSTOLIC BLOOD PRESSURE: 129 MMHG | DIASTOLIC BLOOD PRESSURE: 81 MMHG

## 2025-03-11 DIAGNOSIS — R35.1 NOCTURIA: ICD-10-CM

## 2025-03-11 DIAGNOSIS — E66.811 OBESITY (BMI 30.0-34.9): ICD-10-CM

## 2025-03-11 DIAGNOSIS — E83.19 OTHER DISORDERS OF IRON METABOLISM: ICD-10-CM

## 2025-03-11 DIAGNOSIS — G25.81 RESTLESS LEGS SYNDROME (RLS): ICD-10-CM

## 2025-03-11 DIAGNOSIS — R06.81 WITNESSED APNEIC SPELLS: Primary | ICD-10-CM

## 2025-03-11 PROCEDURE — 3074F SYST BP LT 130 MM HG: CPT

## 2025-03-11 PROCEDURE — 3079F DIAST BP 80-89 MM HG: CPT

## 2025-03-11 PROCEDURE — 82728 ASSAY OF FERRITIN: CPT

## 2025-03-11 PROCEDURE — 36415 COLL VENOUS BLD VENIPUNCTURE: CPT

## 2025-03-11 PROCEDURE — 99203 OFFICE O/P NEW LOW 30 MIN: CPT

## 2025-03-11 PROCEDURE — 1126F AMNT PAIN NOTED NONE PRSNT: CPT

## 2025-03-11 RX ORDER — MIRABEGRON 25 MG/1
25 TABLET, FILM COATED, EXTENDED RELEASE ORAL DAILY
COMMUNITY
Start: 2025-02-12

## 2025-03-11 RX ORDER — APIXABAN 5 MG/1
1 TABLET, FILM COATED ORAL
COMMUNITY
Start: 2025-02-07

## 2025-03-11 ASSESSMENT — SLEEP AND FATIGUE QUESTIONNAIRES
HOW LIKELY ARE YOU TO NOD OFF OR FALL ASLEEP WHILE SITTING INACTIVE IN A PUBLIC PLACE: WOULD NEVER DOZE
HOW LIKELY ARE YOU TO NOD OFF OR FALL ASLEEP IN A CAR, WHILE STOPPED FOR A FEW MINUTES IN TRAFFIC: WOULD NEVER DOZE
HOW LIKELY ARE YOU TO NOD OFF OR FALL ASLEEP WHILE SITTING AND TALKING TO SOMEONE: WOULD NEVER DOZE
HOW LIKELY ARE YOU TO NOD OFF OR FALL ASLEEP WHEN YOU ARE A PASSENGER IN A CAR FOR AN HOUR WITHOUT A BREAK: WOULD NEVER DOZE
HOW LIKELY ARE YOU TO NOD OFF OR FALL ASLEEP WHILE SITTING AND READING: SLIGHT CHANCE OF DOZING
HOW LIKELY ARE YOU TO NOD OFF OR FALL ASLEEP WHILE WATCHING TV: WOULD NEVER DOZE
HOW LIKELY ARE YOU TO NOD OFF OR FALL ASLEEP WHILE SITTING QUIETLY AFTER LUNCH WITHOUT ALCOHOL: WOULD NEVER DOZE
HOW LIKELY ARE YOU TO NOD OFF OR FALL ASLEEP WHILE LYING DOWN TO REST IN THE AFTERNOON WHEN CIRCUMSTANCES PERMIT: SLIGHT CHANCE OF DOZING

## 2025-03-11 NOTE — PROGRESS NOTES
Outpatient Sleep Medicine Consultation:      Name: Thu Zamora MRN# [id number]   Age: 74 year old YOB: 1950     Date of Consultation: March 11, 2025  Consultation is requested by: No referring provider defined for this encounter. No ref. provider found  Primary care provider: Berna Madsen       Reason for Sleep Consult:     Thu Zamora is sent by No ref. provider found for a sleep consultation regarding suspected sleep apnea.    Patient s Reason for visit  Thu Zamora main reason for visit: (Patient-Rptd) A colonoscapy doctor said i needed one  Patient states problem(s) started: (Patient-Rptd) last fall  Thu Zamora's goals for this visit: (Patient-Rptd) find out if i stop stop breathing a lot while sleeping           Assessment and Plan:     Summary Sleep Diagnoses:  (R06.81) Witnessed apneic spells  (primary encounter diagnosis) (E66.811) Obesity (BMI 30.0-34.9)   (R35.1) Nocturia   Comment: Thu is a 74-year-old female who is sent to the sleep clinic after having witnessed apnea episodes during her colonoscopy. She has been told she snores and she was told she stopped breathing a few times during her colonoscopy in October. Every once in a while she will have a gasp arousal. She usually feels rested in the morning. She is not normally tired during the day. Thu does wake 2-3 times per night to use the bathroom. Her STOP-BANG is 4/8- snoring, observed apnea, HTN, and age > 50 (74).        Plan: HST-Home Sleep Apnea Test - Noxturnal Returnable  Thu is at intermediate risk for obstructive sleep apnea. She would like to complete a home sleep study. She doesn't like to drive at night. She is a good candidate for a home sleep study. We reviewed treatment options for obstructive sleep apnea including PAP therapy, mandibular advancement device, positional therapy, surgery, weight management, and hypoglossal nerve stimulator. Thu would like to first work on weight loss. She would like  to lose 30 lbs.     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 She has been on ropinirole for years. She also has a pedal bike and CBD oil that will help relieve restless legs. No ferritin on file. She notices restless legs are worse when she is more sedentary or on her feet too much during the day.    Plan: Ferritin  Continue taking 0.5 mg ropinirole at bedtime. Recommended checking a ferritin level and starting iron supplementation if her ferritin were low.     Comorbid Diagnoses:  Obesity, paroxymal atrial fibrillation s/p ablation, HTN, RLS    Summary Recommendations:  Orders Placed This Encounter   Procedures    HST-Home Sleep Apnea Test - Noxturnal Returnable    Ferritin     Summary Counseling:    Sleep Testing Reviewed  Obstructive Sleep Apnea Reviewed  Complications of Untreated Sleep Apnea Reviewed    Patient will follow up approximately 3 months after sleep study. Results of the study will be reviewed via [facility] and treatment will be initiated prior to the follow up visit.   SUNDAY Olivier CNP    Total time spent reviewing medical records, history and physical examination, review of previous testing and interpretation as well as documentation on this date: 34 minutes     CC: No ref. provider found          History of Present Illness:     Thu is sent to the sleep clinic after having witnessed apnea events during her colonoscopy. She has been told she snores and she was told she stopped breathing a few times during her colonoscopy in October. Every once in a while she will have a gasp arousal. She usually feels rested in the morning. She is not normally tired during the day.        She would like to lose 30 lbs.    Her  did report snoring before he passed away two years ago. He had lung cancer.     RLS managed with 0.5 mg ropinirole at bedtime. She has been on ropinirole for years. She  also has a pedal bike that will help relieve restless legs. No ferritin on file. She has also tried CBD oil.       Past Sleep Evaluations: None     SLEEP-WAKE SCHEDULE:     Work/School Days: Patient goes to school/work: (Patient-Rptd) Yes   Usually gets into bed at (Patient-Rptd) 10ish 10:30-11 PM on non work days   Takes patient about (Patient-Rptd) several minutes to fall asleep  Has trouble falling asleep (Patient-Rptd) almost every night nights per week  Wakes up in the middle of the night (Patient-Rptd) 2~3 times a night.  Wakes up due to (Patient-Rptd) Snorting self awake;Use the bathroom;Other  She has trouble falling back asleep   times a week.   It usually takes   to get back to sleep  Patient is usually up at 6 am on work days and 8-9 AM on non work days  Uses alarm: (Patient-Rptd) Yes    Weekends/Non-work Days/All Other Days:  Usually gets into bed at (Patient-Rptd) 10:30   Takes patient about (Patient-Rptd) 10~15 minutes to fall asleep  Patient is usually up at (Patient-Rptd) 8 or 9 am  Uses alarm: (Patient-Rptd) No    Sleep Need  Patient gets (Patient-Rptd) 7~8 hours sleep on average   Patient thinks she needs about (Patient-Rptd) 8~9 hours sleep    Thu JIMENES Kyleejan prefers to sleep in this position(s): (Patient-Rptd) Side   Patient states they do the following activities in bed: (Patient-Rptd) Read    Naps  Patient takes a purposeful nap (Patient-Rptd) 1 or 2 times a week and naps are usually (Patient-Rptd) 1/2 to 1 hour in duration. Maybe once per week. She does not normally nap.   She feels better after a nap: (Patient-Rptd) Yes  She dozes off unintentionally (Patient-Rptd) 0 days per week  Patient has had a driving accident or near-miss due to sleepiness/drowsiness: (Patient-Rptd) No    SLEEP DISRUPTIONS:    Breathing/Snoring  Patient snores:   Other people complain about her snoring:    Patient has been told she stops breathing in her sleep:(Patient-Rptd) Yes  She has issues with the following:  Denies morning headaches unless she is having sinus issues.     Movement:  Patient gets pain, discomfort, with an urge to move: (Patient-Rptd) Yes She does have restless legs.   It happens when she is resting: (Patient-Rptd) No  It happens more at night:     Patient has been told she kicks her legs at night:        Behaviors in Sleep:  Thu Zamora has experienced the following behaviors while sleeping:    She has experienced sudden muscle weakness during the day: (Patient-Rptd) No  Pt denies bruxism, sleep talking, sleep walking, and dream enactment behavior. Pt denies sleep paralysis, hypnagogue and cataplexy.    Is there anything else you would like your sleep provider to know: (Patient-Rptd) i have restless leg    CAFFEINE AND OTHER SUBSTANCES:    Patient consumes caffeinated beverages per day: (Patient-Rptd) 3~4cups  Last caffeine use is usually: (Patient-Rptd) am  List of any prescribed or over the counter stimulants that patient takes:    List of any prescribed or over the counter sleep medication patient takes: None   List of previous sleep medications that patient has tried: None   Patient drinks alcohol to help them sleep: (Patient-Rptd) No  Patient drinks alcohol near bedtime: (Patient-Rptd) Yes    Family History:  Patient has a family member been diagnosed with a sleep disorder: (Patient-Rptd) No      Social History: Her  passed away 3 years ago. She works three days per week. She goes to [place] in the winter.    SCALES:    EPWORTH SLEEPINESS SCALE         3/11/2025    11:23 AM    [place] Sleepiness Scale ( [employer]  [id number]<br>ESS - [place]/English - Final version - 21 Nov 07 - [facility] [place].)   Sitting and reading Slight chance of dozing   Watching TV Would never doze   Sitting, inactive in a public place (e.g. a theatre or a meeting) Would never doze   As a passenger in a car for an hour without a break Would never doze   Lying down to rest in the afternoon when circumstances  "permit Slight chance of dozing   Sitting and talking to someone Would never doze   Sitting quietly after a lunch without alcohol Would never doze   In a car, while stopped for a few minutes in traffic Would never doze   [place] Score (MC) 2   [place] Score (Sleep) 2        Patient-reported         INSOMNIA SEVERITY INDEX (PANFILO)          3/11/2025    11:04 AM   Insomnia Severity Index (PANFILO)   Difficulty falling asleep 2   Difficulty staying asleep 2   Problems waking up too early 2   How SATISFIED/DISSATISFIED are you with your CURRENT sleep pattern? 2   How NOTICEABLE to others do you think your sleep problem is in terms of impairing the quality of your life? 2   How WORRIED/DISTRESSED are you about your current sleep problem? 2   To what extent do you consider your sleep problem to INTERFERE with your daily functioning (e.g. daytime fatigue, mood, ability to function at work/daily chores, concentration, memory, mood, etc.) CURRENTLY? 2   PANFILO Total Score 14        Patient-reported       Guidelines for Scoring/Interpretation:  Total score categories:  0-7 = No clinically significant insomnia   8-14 = Subthreshold insomnia   15-21 = Clinical insomnia (moderate severity)  22-28 = Clinical insomnia (severe)  Used via courtesy of www.[employer]ix[employer].va.gov with permission from Chad Zimmerman PhD., [facility]      STOP BANG         3/11/2025    11:38 AM   STOP BANG Questionnaire (  2008, the American Society of Anesthesiologists, Inc. [employer].)   [facility]: 129/81         GAD7         No data to display                  CAGE-AID         No data to display                CAGE-AID reprinted with permission from the [place] Medical Journal, DIEGO Martino and MACKENZIE Izaguirre, \"Conjoint screening questionnaires for alcohol and drug abuse\" [place] Medical Journal 94: 135-140, 1995.      PATIENT HEALTH QUESTIONNAIRE-9 (PHQ - 9)         No data to display                Developed by Kayla SUGGS " Zoey Gregory, Gurdeep Pillai and colleagues, with an educational sharon from [employer] No permission required to reproduce, translate, display or distribute.        Allergies:    No Known Allergies    Medications:    Current Outpatient Medications   Medication Sig Dispense Refill    Ascorbic Acid (VITAMIN C PO) Take by mouth.      azelastine (ASTELIN) 0.1 % nasal spray Spray 1 spray into both nostrils every morning      Bacillus Coagulans-Inulin (PROBIOTIC) 1-250 BILLION-MG CAPS Take 1 capsule by mouth every morning.      calcium carbonate (OS-SUMMER 500 MG [place]. [place]) 500 MG tablet Take 500 mg by mouth daily      cetirizine (ZYRTEC) 10 MG tablet 1 tab up to twice a day for itch/rash 20 tablet 1    ciclopirox (PENLAC) 8 % external solution Apply topically every evening      ELIQUIS ANTICOAGULANT 5 MG tablet Take 1 tablet by mouth 2 times daily.      glucosamine-chondroitinoitin 500-400 MG tablet Take 2 tablets by mouth daily      latanoprost (XALATAN) 0.005 % ophthalmic solution       losartan (COZAAR) 25 MG tablet Take 25 mg by mouth      magnesium oxide (MAG-OX) 400 (240 Mg) MG tablet Take 400 mg by mouth daily      metoprolol succinate ER (TOPROL-XL) 25 MG 24 hr tablet Take 25 mg by mouth daily      mirabegron (MYRBETRIQ) 25 MG 24 hr tablet Take 25 mg by mouth daily.      polyethylene glycol-propylene glycol (SYSTANE ULTRA) 0.4-0.3 % SOLN ophthalmic solution Place 1 drop into both eyes every morning      rOPINIRole (REQUIP) 0.5 MG tablet Take 0.5 mg by mouth At Bedtime      rosuvastatin (CRESTOR) 10 MG tablet Take 5 mg by mouth At Bedtime      sotalol (BETAPACE) 80 MG tablet       vitamin E 400 units TABS Take 400 Units by mouth At Bedtime      zinc gluconate 50 MG tablet Take 50 mg by mouth every other day         Problem List:  Patient Active Problem List    Diagnosis Date Noted    Atrial fibrillation with rapid rate, paroxysmal 03/21/2015     Priority: Medium    Family history stroke in brother  2015     Priority: Medium    Mild alcohol use disorder 2015     Priority: Medium    Paroxysmal atrial fibrillation (H) 2015     Priority: Medium        Past Medical/Surgical History:  Past Medical History:   Diagnosis Date    Hypertension      Past Surgical History:   Procedure Laterality Date    HC KNEE SCOPE,MED/LAT MENISCUS REPAIR Right        Social History:  Social History     Socioeconomic History    Marital status:      Spouse name: Not on file    Number of children: Not on file    Years of education: Not on file    Highest education level: Not on file   Occupational History    Not on file   Tobacco Use    Smoking status: Former     Current packs/day: 0.00     Types: Cigarettes     Quit date: 1995     Years since quittin.2    Smokeless tobacco: Never   Substance and Sexual Activity    Alcohol use: Yes     Alcohol/week: 11.7 standard drinks of alcohol     Types: 14 Cans of beer per week     Comment: 3 drinks/day    Drug use: No    Sexual activity: Never   Other Topics Concern    Parent/sibling w/ CABG, MI or angioplasty before 65F 55M? Not Asked   Social History Narrative    Not on file     Social Drivers of Health     Financial Resource Strain: Not on file   Food Insecurity: Not on file   Transportation Needs: Not on file   Physical Activity: Not on file   Stress: Not on file   Social Connections: Not on file   Interpersonal Safety: Not At Risk (2023)    Received from [facility]    [facility] Custom IPV     Do you feel UNSAFE in any of your personal relationships with your family members or any other acquaintances?: No   Housing Stability: Not on file       Family History:  Family History   Problem Relation Age of Onset    Heart Disease Mother         leaky valve, atrial fibrillation    Heart Disease Brother         leaky valve, atrial fibrillation    Cerebrovascular Disease Brother         2 strokes       Review of Systems:  A complete  "review of systems reviewed by me is negative with the exeption of what has been mentioned in the history of present illness.  In the last TWO WEEKS have you experienced any of the following symptoms?  Fevers: (Patient-Rptd) No  Night Sweats: (Patient-Rptd) Yes  Weight Gain: (Patient-Rptd) Yes  Pain at Night: (Patient-Rptd) No  Double Vision: (Patient-Rptd) No  Changes in Vision: (Patient-Rptd) No  Difficulty Breathing through Nose: (Patient-Rptd) Yes  Sore Throat in Morning: (Patient-Rptd) No  Dry Mouth in the Morning: (Patient-Rptd) Yes  Shortness of Breath Lying Flat: (Patient-Rptd) No  Shortness of Breath With Activity: (Patient-Rptd) Yes  Awakening with Shortness of Breath: (Patient-Rptd) No  Increased Cough: (Patient-Rptd) No  Heart Racing at Night: (Patient-Rptd) Yes  Swelling in Feet or Legs: (Patient-Rptd) No  Heartburn at Night: (Patient-Rptd) Yes  Urinating More than Once at Night: (Patient-Rptd) Yes  Losing Control of Urine at Night: (Patient-Rptd) No  Joint Pains at Night: (Patient-Rptd) No  Headaches in Morning: (Patient-Rptd) No  Weakness in Arms or Legs: (Patient-Rptd) Yes  Depressed Mood: (Patient-Rptd) No  Anxiety: (Patient-Rptd) No     Physical Examination:  Vitals: /81   Pulse 64   Resp 12   Ht 1.676 m (5' 6\")   Wt 86.2 kg (190 lb)   SpO2 97%   BMI 30.67 kg/m    BMI= Body mass index is 30.67 kg/m .    Neck Cir (cm): 40 cm    GENERAL APPEARANCE: healthy, alert, no distress, and cooperative  EYES: Eyes grossly normal to inspection, PERRL, and conjunctivae and sclerae normal  NECK: no asymmetry, masses, or scars  RESP: no increased work of breathing noted, no audible cough or wheeze   NEURO: mentation intact and speech normal  PSYCH: mentation appears normal and affect normal/bright         Data: All pertinent previous laboratory data reviewed     Recent Labs   Lab Test 03/11/21  1330      POTASSIUM 4.8   CHLORIDE 108   CO2 27   ANIONGAP 4   *   BUN 12   CR 0.73   SUMMER 9.4 " "      Recent Labs   Lab Test 03/11/21  1330   WBC 5.6   RBC 3.78*   HGB 12.6   HCT 38.8   *   MCH 33.3*   MCHC 32.5   RDW 12.8          Recent Labs   Lab Test 03/11/21  1330   PROTTOTAL 7.4   ALBUMIN 3.9   BILITOTAL 0.6   ALKPHOS 73   AST 32   ALT 43       TSH (mU/L)   Date Value   03/21/2015 2.01       No results found for: \"UAMP\", \"UBARB\", \"BENZODIAZEUR\", \"UCANN\", \"UCOC\", \"OPIT\", \"UPCP\"    No results found for: \"IRONSAT\", \"DU03788\", \"TOBY\"    No results found for: \"PH\", \"PHARTERIAL\", \"PO2\", \"BO9PRHUCUSP\", \"SAT\", \"PCO2\", \"HCO3\", \"BASEEXCESS\", \"MIAH\", \"BEB\"    @LABRCNTIPR(phv:4,pco2v:4,po2v:4,hco3v:4,yumiko:4,o2per:4)@    Echocardiology: No results found for this or any previous visit (from the past 4320 hours).    Chest x-ray:   No results found for this or any previous visit from the past 365 days.      Chest CT:   No results found for this or any previous visit from the past 365 days.      PFT: Most Recent [facility] Pulmonary Function Testing    Petrona Mars, SUNDAY CNP 3/11/2025   "

## 2025-03-11 NOTE — NURSING NOTE
"Chief Complaint   Patient presents with    Sleep Apnea     Hx of colonoscopy and during procedure patient would stop breathing.     Snoring    Sleep Problem     Patient states she has restless leg at night.        Initial /81   Pulse 64   Resp 12   Ht 1.676 m (5' 6\")   Wt 86.2 kg (190 lb)   SpO2 97%   BMI 30.67 kg/m   Estimated body mass index is 30.67 kg/m  as calculated from the following:    Height as of this encounter: 1.676 m (5' 6\").    Weight as of this encounter: 86.2 kg (190 lb).    Medication Reconciliation: complete    Neck circumference: 15.75 inches / 40 centimeters.    DME: N/A    Mateo Ramos CMA on 3/11/2025 at 11:37 AM      "

## 2025-03-11 NOTE — PATIENT INSTRUCTIONS
"          MY TREATMENT INFORMATION FOR SLEEP APNEA-  Thu Zamora    DOCTOR : SUNDAY Olivier CNP    Am I having a sleep study at a sleep center?  --->Due to normal delays, you will be contacted within 2-4 weeks to schedule    Am I having a home sleep study?  --->Watch the video for the device you are using:    -/drop off device-  https://www.Aledade.com/watch?v=yGGFBdELGhk    Frequently asked questions:  1. What is Obstructive Sleep Apnea (GARRY)? GARRY is the most common type of sleep apnea. Apnea means, \"without breath.\"  Apnea is most often caused by narrowing or collapse of the upper airway as muscles relax during sleep.   Almost everyone has occasional apneas. Most people with sleep apnea have had brief interruptions at night frequently for many years.  The severity of sleep apnea is related to how frequent and severe the events are.   2. What are the consequences of GARRY? Symptoms include: feeling sleepy during the day, snoring loudly, gasping or stopping of breathing, trouble sleeping, and occasionally morning headaches or heartburn at night. Sleepiness can be serious and even increase the risk of falling asleep while driving. Other health consequences may include development of high blood pressure and other cardiovascular disease in persons who are susceptible. Untreated GARRY can contribute to heart disease, stroke and diabetes.   3. What are the treatment options? In most situations, sleep apnea is a lifelong disease that must be managed with daily therapy. Medications are not effective for sleep apnea and surgery is generally not considered until other therapies have been tried. Your treatment is your choice . Continuous Positive Airway (CPAP) works right away and is the therapy that is effective in nearly everyone. An oral device to hold your jaw forward is usually the next most reliable option. Other options include postioning devices (to keep you off your back), weight loss, and surgery " including a tongue pacing device. There is more detail about some of these options below.  4. Are my sleep studies covered by insurance? Although we will request verification of coverage, we advise you also check in advance of the study to ensure there is coverage.    Important tips for those choosing CPAP and similar devices  REMEMBER-IF YOU RECEIVE A CALL FROM [phone]--> IT IS TO SETUP A DEVICE  For new devices, sign up for device LAKE to monitor your device for your followup visits  We encourage you to utilize the [employer] lake or website (https://[employer]/) to monitor your therapy progress and share the data with your healthcare team when you discuss your sleep apnea.                                                    Know your equipment:  CPAP is continuous positive airway pressure that prevents obstructive sleep apnea by keeping the throat from collapsing while you are sleeping. In most cases, the device is  smart  and can slowly self-adjusts if your throat collapses and keeps a record every day of how well you are treated-this information is available to you and your care team.  BPAP is bilevel positive airway pressure that keeps your throat open and also assists each breath with a pressure boost to maintain adequate breathing.  Special kinds of BPAP are used in patients who have inadequate breathing from lung or heart disease. In most cases, the device is  smart  and can slowly self-adjusts to assist breathing. Like CPAP, the device keeps a record of how well you are treated.  Your mask is your connection to the device. You get to choose what feels most comfortable and the staff will help to make sure if fits. Here: are some examples of the different masks that are available: Magnetic mask aids may assist with use but there are safety issues that should be addressed when considering with magnets* (see end of discussion).       Key points to remember on your journey with sleep apnea:  Sleep  study.  PAP devices often need to be adjusted during a sleep study to show that they are effective and adjusted right.  Good tips to remember: Try wearing just the mask during a quiet time during the day so your body adapts to wearing it. A humidifier is recommended for comfort in most cases to prevent drying of your nose and throat. Allergy medication from your provider may help you if you are having nasal congestion.  Getting settled-in. It takes more than one night for most of us to get used to wearing a mask. Try wearing just the mask during a quiet time during the day so your body adapts to wearing it. A humidifier is recommended for comfort in most cases. Our team will work with you carefully on the first day and will be in contact within 4 days and again at 2 and 4 weeks for advice and remote device adjustments. Your therapy is evaluated by the device each day.   Use it every night. The more you are able to sleep naturally for 7-8 hours, the more likely you will have good sleep and to prevent health risks or symptoms from sleep apnea. Even if you use it 4 hours it helps. Occasionally all of us are unable to use a medical therapy, in sleep apnea, it is not dangerous to miss one night.   Communicate. Call our skilled team on the number provided on the first day if your visit for problems that make it difficult to wear the device. Over 2 out of 3 patients can learn to wear the device long-term with help from our team. Remember to call our team or your sleep providers if you are unable to wear the device as we may have other solutions for those who cannot adapt to mask CPAP therapy. It is recommended that you sleep your sleep provider within the first 3 months and yearly after that if you are not having problems.   Use it for your health. We encourage use of CPAP masks during daytime quiet periods to allow your face and brain to adapt to the sensation of CPAP so that it will be a more natural sensation to awaken  to at night or during naps. This can be very useful during the first few weeks or months of adapting to CPAP though it does not help medically to wear CPAP during wakefulness and  should not be used as a strategy just to meet guidelines.  Take care of your equipment. Make sure you clean your mask and tubing using directions every day and that your filter and mask are replaced as recommended or if they are not working.     *Masks with magnets:  Updated Contraindications  Masks with magnetic components are contraindicated for use by patients where they, or anyone in close physical contact while using the mask, have the following:   Active medical implants that interact with magnets (i.e., pacemakers, implantable cardioverter defibrillators (ICD), neurostimulators, cerebrospinal fluid (CSF) shunts, insulin/infusion pumps)   Metallic implants/objects containing ferromagnetic material (i.e., aneurysm clips/flow disruption devices, embolic coils, stents, valves, electrodes, implants to restore hearing or balance with implanted magnets, ocular implants, metallic splinters in the eye)  Updated Warning  Keep the mask magnets at a safe distance of at least 6 inches (150 mm) away from implants or medical devices that may be adversely affected by magnetic interference. This warning applies to you or anyone in close physical contact with your mask. The magnets are in the frame and lower headgear clips, with a magnetic field strength of up to 400mT. When worn, they connect to secure the mask but may inadvertently detach while asleep.  Implants/medical devices, including those listed within contraindications, may be adversely affected if they change function under external magnetic fields or contain ferromagnetic materials that attract/repel to magnetic fields (some metallic implants, e.g., contact lenses with metal, dental implants, metallic cranial plates, screws, lex hole covers, and bone substitute devices). Consult your  physician and  of your implant / other medical device for information on the potential adverse effects of magnetic fields.    BESIDES CPAP, WHAT OTHER THERAPIES ARE THERE?    Positioning Device  Positioning devices are generally used when sleep apnea is mild and only occurs on your back.This example shows a pillow that straps around the waist. It may be appropriate for those whose sleep study shows milder sleep apnea that occurs primarily when lying flat on one's back. Preliminary studies have shown benefit but effectiveness at home may need to be verified by a home sleep test. These devices are generally not covered by medical insurance.  Examples of devices that maintain sleeping on the back to prevent snoring and mild sleep apnea.    Belt type body positioner  http://[employer]x/    Electronic reminder  http://nightshifttherapy.com/            Oral Appliance  What is oral appliance therapy?  An oral appliance device fits on your teeth at night like a retainer used after having braces. The device is made by a specialized dentist and requires several visits over 1-2 months before a manufactured device is made to fit your teeth and is adjusted to prevent your sleep apnea. Once an oral device is working properly, snoring should be improved. A home sleep test may be recommended at that time if to determine whether the sleep apnea is adequately treated.       Some things to remember:  -Oral devices are often, but not always, covered by your medical insurance. Be sure to check with your insurance provider.   -If you are referred for oral therapy, you will be given a list of specialized dentists to consider or you may choose to visit the Web site of the American Academy of Dental Sleep Medicine  -Oral devices are less likely to work if you have severe sleep apnea or are extremely overweight.     More detailed information  An oral appliance is a small acrylic device that fits over the upper and lower teeth   (similar to a retainer or a mouth guard). This device slightly moves jaw forward, which moves the base of the tongue forward, opens the airway, improves breathing for effective treat snoring and obstructive sleep apnea in perhaps 7 out of 10 people .  The best working devices are custom-made by a dental device  after a mold is made of the teeth 1, 2, 3.  When is an oral appliance indicated?  Oral appliance therapy is recommended as a first-line treatment for patients with primary snoring, mild sleep apnea, and for patients with moderate sleep apnea who prefer appliance therapy to use of CPAP4, 5. Severity of sleep apnea is determined by sleep testing and is based on the number of respiratory events per hour of sleep.   How successful is oral appliance therapy?  The success rate of oral appliance therapy in patients with mild sleep apnea is 75-80% while in patients with moderate sleep apnea it is 50-70%. The chance of success in patients with severe sleep apnea is 40-50%. The research also shows that oral appliances have a beneficial effect on the cardiovascular health of GARRY patients at the same magnitude as CPAP therapy7.  Oral appliances should be a second-line treatment in cases of severe sleep apnea, but if not completely successful then a combination therapy utilizing CPAP plus oral appliance therapy may be effective. Oral appliances tend to be effective in a broad range of patients although studies show that the patients who have the highest success are females, younger patients, those with milder disease, and less severe obesity. 3, 6.   Finding a dentist that practices dental sleep medicine  Specific training is available through the American Academy of Dental Sleep Medicine for dentists interested in working in the field of sleep. To find a dentist who is educated in the field of sleep and the use of oral appliances, near you, visit the Web site of the American Academy of Dental Sleep  Medicine.    References  1. Ana M et al. Objectively measured vs self-reported compliance during oral appliance therapy for sleep-disordered breathing. Chest 2013; 144(5): 8316-3401.  2. Aman et al. Objective measurement of compliance during oral appliance therapy for sleep-disordered breathing. Thorax 2013; 68(1): 91-96.  3. Kim et al. Mandibular advancement devices in 620 men and women with GARRY and snoring: tolerability and predictors of treatment success. Chest 2004; 125: 0967-4110.  4. Nettie et al. Oral appliances for snoring and GARRY: a review. Sleep 2006; 29: 244-262.  5. Soila et al. Oral appliance treatment for GARRY: an update. J Clin Sleep Med 2014; 10(2): 215-227.  6. Ariella et al. Predictors of OSAH treatment outcome. J Dent Res 2007; 86: 8048-8334.      Weight Loss: Your Body mass index is 30.67 kg/m .    Being overweight does not necessarily mean you will have health consequences.  Those who have BMI over 30 or over 27 with existing medical conditions carries greater risk.   Weight loss decreases severity of sleep apnea in most people with obesity. For those with mild obesity who have developed snoring with weight gain, even 15-30 pound weight loss can improve and occasionally milder eliminate sleep apnea.  Structured and life-long dietary and health habits are necessary to lose weight and keep healthier weight levels.     The Comprehensive Weight loss program offers all aspects of weight loss strategies including two Non-Surgical Weight Loss Programs: Medical Weight Management and our 24 Week Healthy Lifestyle Program:  Medical Weight Management: You will meet with a Medical Weight Management Provider, as well as a Registered Dietician. The program may include medication therapy, dietary education, recommended exercise and physical therapy programs, monthly support group meetings, and possible psychological counseling. Follow up visits with the provider or dietician are  scheduled based on your progress and needs.  24 Week Healthy Lifestyle Program: This unique program is designed to give you the support of weekly appointments and activities thru a 24-week period. It may include all of the components of the basic program (above), with the addition of 11 individual Health  Visits, 24-week access to the [employer] website for over 700 online classes, and monthly support group meetings. This program has an out-of-pocket expense of $499 to cover the items that can not be billed to insurance (health coaches and [employer] access), and is non-refundable/non-transferable (you may be able to use a Health Savings Account; ask your HSA provider). There may be an optional meal replacement plan prescribed as well.   Medication therapy has been approved for the treatment of sleep apnea: The FDA approved tirzepatide for moderate to severe sleep apnea (apnea-hypopnea index greater than or equal to 15) in patients with BMI of greater than or equal to 30, or BMI greater than or equal to 27 with at least 1 weight-related condition such as hypertension or dyslipidemia.  Surgical management achieves meaningful long-term weight loss and improvement in health risks in most patients with more severe obesity.      Sleep Apnea Surgery:    Surgery for obstructive sleep apnea is considered generally only when other therapies fail to work. Surgery may be discussed with you if you are having a difficult time tolerating CPAP and or when there is an abnormal structure that requires surgical correction.  Nose and throat surgeries often enlarge the airway to prevent collapse.  Most of these surgeries create pain for 1-2 weeks and up to half of the most common surgeries are not effective throughout life.  You should carefully discuss the benefits and drawbacks to surgery with your sleep provider and surgeon to determine if it is the best solution for you.   More information  Surgery for GARRY is directed at areas  that are responsible for narrowing or complete obstruction of the airway during sleep.  There are a wide range of procedures available to enlarge and/or stabilize the airway to prevent blockage of breathing in the three major areas where it can occur: the palate, tongue, and nasal regions.  Successful surgical treatment depends on the accurate identification of the factors responsible for obstructive sleep apnea in each person.  A personalized approach is required because there is no single treatment that works well for everyone.  Because of anatomic variation, consultation with an examination by a sleep surgeon is a critical first step in determining what surgical options are best for each patient.  In some cases, examination during sedation may be recommended in order to guide the selection of procedures.  Patients will be counseled about risks and benefits as well as the typical recovery course after surgery. Surgery is typically not a cure for a person s GARRY.  However, surgery will often significantly improve one s GARRY severity (termed  success rate ).  Even in the absence of a cure, surgery will decrease the cardiovascular risk associated with OSA7; improve overall quality of life8 (sleepiness, functionality, sleep quality, etc).    Palate Procedures:  Patients with GARRY often have narrowing of their airway in the region of their tonsils and uvula.  The goals of palate procedures are to widen the airway in this region as well as to help the tissues resist collapse.  Modern palate procedure techniques focus on tissue conservation and soft tissue rearrangement, rather than tissue removal.  Often the uvula is preserved in this procedure. Residual sleep apnea is common in patient after pharyngoplasty with an average reduction in sleep apnea events of 33%2.      Tongue Procedures:  While patients are awake, the muscles that surround the throat are active and keep this region open for breathing. These muscles relax  during sleep, allowing the tongue and other structures to collapse and block breathing.  There are several different tongue procedures available.  Selection of a tongue base procedure depends on characteristics seen on physical exam.  Generally, procedures are aimed at removing bulky tissues in this area or preventing the back of the tongue from falling back during sleep.  Success rates for tongue surgery range from 50-62%3.    Hypoglossal Nerve Stimulation:  Hypoglossal nerve stimulation has recently received approval from the [place] Food and Drug Administration for the treatment of obstructive sleep apnea.  This is based on research showing that the system was safe and effective in treating sleep apnea6.  Results showed that the median AHI score decreased 68%, from 29.3 to 9.0. This therapy uses an implant system that senses breathing patterns and delivers mild stimulation to airway muscles, which keeps the airway open during sleep.  The system consists of three fully implanted components: a small generator (similar in size to a pacemaker), a breathing sensor, and a stimulation lead.  Using a small handheld remote, a patient turns the therapy on before bed and off upon awakening.    Candidates for this device must be greater than 18 years of age, have moderate to severe obstructive sleep apnea with less than 25% central events  (AHI between 15-65), BMI less than 35, have tried CPAP/oral appliance for at least 8 weeks without success, and have appropriate upper airway anatomy (determined by a sleep endoscopy performed by Dr. Asael Peñaloza or Dr. Melecio Shirley).     Nasal Procedures:  Nasal obstruction can interfere with nasal breathing during the day and night.  Studies have shown that relief of nasal obstruction can improve the ability of some patients to tolerate positive airway pressure therapy for obstructive sleep apnea1.  Treatment options include medications such as nasal saline, topical corticosteroid  and antihistamine sprays, and oral medications such as antihistamines or decongestants. Non-surgical treatments can include external nasal dilators for selected patients. If these are not successful by themselves, surgery can improve the nasal airway either alone or in combination with these other options.        Combination Procedures:  Combination of surgical procedures and other treatments may be recommended, particularly if patients have more than one area of narrowing or persistent positional disease.  The success rate of combination surgery ranges from 66-80%2,3.    References  Tim OWENS. The Role of the Nose in Snoring and Obstructive Sleep Apnoea: An Update.  Eur Arch Otorhinolaryngol. 2011; 268: 1365-73.   Tala SM; Marybel JA; Vinay JR; Pallanch JF; Bridgett MB; Liseth SG; Shelby STERN. Surgical modifications of the upper airway for obstructive sleep apnea in adults: a systematic review and meta-analysis. SLEEP 2010;33(10):9667-1898. Jesus DURON. Hypopharyngeal surgery in obstructive sleep apnea: an evidence-based medicine review.  Arch Otolaryngol Head Neck Surg. 2006 Feb;132(2):206-13.  David YH1, Nuha Y, Gene EDUARD. The efficacy of anatomically based multilevel surgery for obstructive sleep apnea. Otolaryngol Head Neck Surg. 2003 Oct;129(4):327-35.  Jesus DURON, Goldberg A. Hypopharyngeal Surgery in Obstructive Sleep Apnea: An Evidence-Based Medicine Review. Arch Otolaryngol Head Neck Surg. 2006 Feb;132(2):206-13.  Starr PJ et al. Upper-Airway Stimulation for Obstructive Sleep Apnea.  N Engl J Med. 2014 Jan 9;370(2):139-49.  Ángel Y et al. Increased Incidence of Cardiovascular Disease in Middle-aged Men with Obstructive Sleep Apnea. Am J Respir Crit Care Med; 2002 166: 159-165  Adelso EM et al. Studying Life Effects and Effectiveness of Palatopharyngoplasty (SLEEP) study: Subjective Outcomes of Isolated Uvulopalatopharyngoplasty. Otolaryngol Head Neck Surg. 2011; 144: 623-631.    WHAT IF I ONLY HAVE  SNORING?  Mandibular advancement devices, lateral sleep positioning, long-term weight loss and treatment of nasal allergies have been shown to improve snoring.  Exercising tongue muscles with a game (https://apps.[employer]/us/lake/soundly-reduce-snoring/uy7202948925) or stimulating the tongue during the day with a device (https://doi.org/10.3390/cca22045154) have improved snoring in some individuals.  https://www.[employer]/  https://www.sleepfoundation.org/best-anti-snoring-mouthpieces-and-mouthguards    Remember to Drive Safe... Drive Alive     Sleep health profoundly affects your health, mood, and your safety.  Thirty three percent of the population (one in three of us) is not getting enough sleep and many have a sleep disorder. Not getting enough sleep or having an untreated / undertreated sleep condition may make us sleepy without even knowing it. In fact, our driving could be dramatically impaired due to our sleep health. As your provider, here are some things I would like you to know about driving:     Here are some warning signs for impairment and dangerous drowsy driving:              -Having been awake more than 16 hours               -Looking tired               -Eyelid drooping              -Head nodding (it could be too late at this point)              -Driving for more than 30 minutes     Some things you could do to make the driving safer if you are experiencing some drowsiness:              -Stop driving and rest              -Call for transportation              -Make sure your sleep disorder is adequately treated     Some things that have been shown NOT to work when experiencing drowsiness while driving:              -Turning on the radio              -Opening windows              -Eating any  distracting  /  entertaining  foods (e.g., sunflower seeds, candy, or any other)              -Talking on the phone      Your decision may not only impact your life, but also the life of others. Please,  remember to drive safe for yourself and all of us.

## 2025-03-12 LAB — FERRITIN SERPL-MCNC: 340 NG/ML (ref 11–328)

## 2025-07-23 ENCOUNTER — OFFICE VISIT (OUTPATIENT)
Dept: SLEEP MEDICINE | Facility: CLINIC | Age: 75
End: 2025-07-23
Payer: COMMERCIAL

## 2025-07-23 DIAGNOSIS — R35.1 NOCTURIA: ICD-10-CM

## 2025-07-23 DIAGNOSIS — E66.811 OBESITY (BMI 30.0-34.9): ICD-10-CM

## 2025-07-23 DIAGNOSIS — R06.81 WITNESSED APNEIC SPELLS: ICD-10-CM

## 2025-07-23 NOTE — PROGRESS NOTES
Pt is completing a home sleep test. Pt was instructed on how to put on the Noxturnal T3 device and associated equipment before going to bed and given the opportunity to practice putting it on before leaving the sleep center. Pt was reminded to bring the home sleep test kit back to the center tomorrow, at the scheduled time for download and reporting. Patient was instructed to complete study using the following treatment?  None  Neck circumference: 40 CM / 15.75 inches.  ESS: 2  Stop Bang: 3  Device number: [id number]

## 2025-07-24 ENCOUNTER — DOCUMENTATION ONLY (OUTPATIENT)
Dept: SLEEP MEDICINE | Facility: CLINIC | Age: 75
End: 2025-07-24
Payer: COMMERCIAL

## 2025-08-28 ENCOUNTER — VIRTUAL VISIT (OUTPATIENT)
Dept: SLEEP MEDICINE | Facility: CLINIC | Age: 75
End: 2025-08-28
Payer: COMMERCIAL

## 2025-08-28 VITALS — HEIGHT: 66 IN | BODY MASS INDEX: 29.73 KG/M2 | WEIGHT: 185 LBS

## 2025-08-28 DIAGNOSIS — G47.33 OSA (OBSTRUCTIVE SLEEP APNEA): Primary | ICD-10-CM

## 2025-08-28 ASSESSMENT — PAIN SCALES - GENERAL: PAINLEVEL_OUTOF10: NO PAIN (0)

## 2025-08-28 ASSESSMENT — SLEEP AND FATIGUE QUESTIONNAIRES
HOW LIKELY ARE YOU TO NOD OFF OR FALL ASLEEP WHILE SITTING QUIETLY AFTER LUNCH WITHOUT ALCOHOL: WOULD NEVER DOZE
HOW LIKELY ARE YOU TO NOD OFF OR FALL ASLEEP IN A CAR, WHILE STOPPED FOR A FEW MINUTES IN TRAFFIC: WOULD NEVER DOZE
HOW LIKELY ARE YOU TO NOD OFF OR FALL ASLEEP WHILE SITTING INACTIVE IN A PUBLIC PLACE: WOULD NEVER DOZE
HOW LIKELY ARE YOU TO NOD OFF OR FALL ASLEEP WHEN YOU ARE A PASSENGER IN A CAR FOR AN HOUR WITHOUT A BREAK: WOULD NEVER DOZE
HOW LIKELY ARE YOU TO NOD OFF OR FALL ASLEEP WHILE WATCHING TV: WOULD NEVER DOZE
HOW LIKELY ARE YOU TO NOD OFF OR FALL ASLEEP WHILE SITTING AND TALKING TO SOMEONE: WOULD NEVER DOZE
HOW LIKELY ARE YOU TO NOD OFF OR FALL ASLEEP WHILE LYING DOWN TO REST IN THE AFTERNOON WHEN CIRCUMSTANCES PERMIT: SLIGHT CHANCE OF DOZING
HOW LIKELY ARE YOU TO NOD OFF OR FALL ASLEEP WHILE SITTING AND READING: SLIGHT CHANCE OF DOZING